# Patient Record
Sex: MALE | Race: WHITE | NOT HISPANIC OR LATINO | URBAN - NONMETROPOLITAN AREA
[De-identification: names, ages, dates, MRNs, and addresses within clinical notes are randomized per-mention and may not be internally consistent; named-entity substitution may affect disease eponyms.]

---

## 2023-04-20 NOTE — PROGRESS NOTES
"Chief Complaint  \"I want a vasectomy \"    Subjective          History of Present Illness  Paulo Drummond presents to [facility] [place] for:    The patient has been pondering the option of a vasectomy for1 year.   He presently has 4 children and is not .   He voices no additional questions about birth control options.   No prior genital procedures.       Current Outpatient Medications:   •  amLODIPine (NORVASC) 5 MG tablet, amlodipine 5 mg tablet  TAKE 1 TABLET BY MOUTH EVERY DAY, Disp: , Rfl:   •  clindamycin (CLEOCIN) 300 MG capsule, Every 8 (Eight) Hours., Disp: , Rfl:   •  lisinopril (PRINIVIL,ZESTRIL) 40 MG tablet, lisinopril 40 mg tablet  TAKE 1 TABLET BY MOUTH EVERY DAY, Disp: , Rfl:   •  omeprazole (priLOSEC) 40 MG capsule, omeprazole 40 mg capsule,delayed release  TAKE 1 CAPSULE BY MOUTH EVERY DAY, Disp: , Rfl:   Past Medical History:   Diagnosis Date   • Hypertension      History reviewed. No pertinent surgical history.      Review  of systems  Constitutional: Negative for chills and fever.   Gastrointestinal: Negative for abdominal pain, anal bleeding and blood in stool.   Genitourinary: Negative for flank pain and hematuria.      Objective   PHYSICAL EXAM  Vital Signs:   Temp 97.2 °F (36.2 °C)   Ht 175.3 cm (69\")   Wt 103 kg (227 lb 12.8 oz)   BMI 33.64 kg/m²     Constitutional: Well nourished, Well developed; No apparent distress  Psychiatric: Appropriate affect; Alert and oriented  Musculoskeletal: Normal gait and station  GI: Abdomen is soft, non-tender  Respiratory: No distress; Unlabored movement; No accessory musculature needed with symmetric movements  ; Penis and testicles are normal.  The vas deferens is very difficult to feel bilaterally due retractile testes bilaterally with resultant decreased scrotal capacity. Vitals reviewed.        DATA  Result Review :         No results found for this or any previous visit.             ASSESSMENT AND PLAN      Problem " List Items Addressed This Visit    None  Visit Diagnoses     Encounter for vasectomy assessment    -  Primary        The patient desires vasectomy. It is emphasized this is a permanent form of birth control. It is explained that reversal, although possible, may not result in pregnancy, is usually not covered by insurance, may not result in producing sperm that are capable of fertilization. Risks of this procedure are discussed.  We discussed that it does take up to 6 months for a patient to clear the proximal sperm through the process of ejaculation.  It is explained that postoperative specimens are essential before consideration of birth control cessation.  Risks of bleeding, infection, sperm granuloma, testicular pain that can become prolonged such as post vasectomy neuralgia, recanalization with resumption of fertility status, testicular atrophy are included in the discussion.  The patient is made aware of other birth control options including permanent sterilization procedures for females.  It is also explained the vasectomy does not reduce the risk of sexually transmitted disease.  It is my recommendation that the patient undergo monitored anesthesia care for the procedure.  The patient has consented to the procedure.        FOLLOW UP     No follow-ups on file.        (Please note that portions of this note were completed with a voice recognition program.)  Christiano Dukes MD  04/27/23  16:20 CDT

## 2023-04-27 ENCOUNTER — OFFICE VISIT (OUTPATIENT)
Dept: UROLOGY | Facility: CLINIC | Age: 43
End: 2023-04-27
Payer: COMMERCIAL

## 2023-04-27 VITALS — HEIGHT: 69 IN | WEIGHT: 227.8 LBS | BODY MASS INDEX: 33.74 KG/M2 | TEMPERATURE: 97.2 F

## 2023-04-27 DIAGNOSIS — Z30.09 ENCOUNTER FOR VASECTOMY ASSESSMENT: Primary | ICD-10-CM

## 2023-04-27 PROCEDURE — 99203 OFFICE O/P NEW LOW 30 MIN: CPT | Performed by: UROLOGY

## 2023-04-27 RX ORDER — CLINDAMYCIN HYDROCHLORIDE 300 MG/1
CAPSULE ORAL EVERY 8 HOURS
COMMUNITY
Start: 2023-04-18

## 2023-04-27 RX ORDER — LISINOPRIL 40 MG/1
TABLET ORAL
COMMUNITY
Start: 2023-04-18

## 2023-04-27 RX ORDER — OMEPRAZOLE 40 MG/1
CAPSULE, DELAYED RELEASE ORAL
COMMUNITY
Start: 2023-04-18

## 2023-04-27 RX ORDER — AMLODIPINE BESYLATE 5 MG/1
TABLET ORAL
COMMUNITY
Start: 2023-04-18

## 2023-05-02 PROBLEM — Z30.09 ENCOUNTER FOR VASECTOMY ASSESSMENT: Status: ACTIVE | Noted: 2023-05-02

## 2023-05-04 ENCOUNTER — TELEPHONE (OUTPATIENT)
Dept: UROLOGY | Facility: CLINIC | Age: 43
End: 2023-05-04
Payer: COMMERCIAL

## 2023-05-04 NOTE — TELEPHONE ENCOUNTER
Called and spoke with patient. I let him know his Vasectomy is scheduled for June 7th. Arrival time will be determined at a later date. Patients pre-op is scheduled for 5/31 at 9:45. Patient understood.    Alert and oriented, no focal deficits, no motor or sensory deficits.

## 2023-05-04 NOTE — TELEPHONE ENCOUNTER
----- Message from Светлана Newman MA sent at 5/2/2023  2:47 PM CDT -----  Patient called asking about his vasectomy- he had mentioned pre op on the 31st and the vas on the 7th, I didn't see anything about this, was unaware if you knew anything

## 2023-05-31 ENCOUNTER — PRE-ADMISSION TESTING (OUTPATIENT)
Dept: PREADMISSION TESTING | Facility: HOSPITAL | Age: 43
End: 2023-05-31
Payer: COMMERCIAL

## 2023-05-31 VITALS
BODY MASS INDEX: 33.31 KG/M2 | HEIGHT: 69 IN | RESPIRATION RATE: 18 BRPM | WEIGHT: 224.87 LBS | HEART RATE: 74 BPM | DIASTOLIC BLOOD PRESSURE: 93 MMHG | SYSTOLIC BLOOD PRESSURE: 158 MMHG | OXYGEN SATURATION: 98 %

## 2023-05-31 LAB
ANION GAP SERPL CALCULATED.3IONS-SCNC: 10 MMOL/L (ref 5–15)
BUN SERPL-MCNC: 13 MG/DL (ref 6–20)
BUN/CREAT SERPL: 14.1 (ref 7–25)
CALCIUM SPEC-SCNC: 8.8 MG/DL (ref 8.6–10.5)
CHLORIDE SERPL-SCNC: 104 MMOL/L (ref 98–107)
CO2 SERPL-SCNC: 25 MMOL/L (ref 22–29)
CREAT SERPL-MCNC: 0.92 MG/DL (ref 0.76–1.27)
DEPRECATED RDW RBC AUTO: 39.9 FL (ref 37–54)
EGFRCR SERPLBLD CKD-EPI 2021: 104.5 ML/MIN/1.73
ERYTHROCYTE [DISTWIDTH] IN BLOOD BY AUTOMATED COUNT: 13 % (ref 12.3–15.4)
GLUCOSE SERPL-MCNC: 81 MG/DL (ref 65–99)
HCT VFR BLD AUTO: 46.9 % (ref 37.5–51)
HGB BLD-MCNC: 15.3 G/DL (ref 13–17.7)
MCH RBC QN AUTO: 27.2 PG (ref 26.6–33)
MCHC RBC AUTO-ENTMCNC: 32.6 G/DL (ref 31.5–35.7)
MCV RBC AUTO: 83.5 FL (ref 79–97)
PLATELET # BLD AUTO: 241 10*3/MM3 (ref 140–450)
PMV BLD AUTO: 10.6 FL (ref 6–12)
POTASSIUM SERPL-SCNC: 4 MMOL/L (ref 3.5–5.2)
RBC # BLD AUTO: 5.62 10*6/MM3 (ref 4.14–5.8)
SODIUM SERPL-SCNC: 139 MMOL/L (ref 136–145)
WBC NRBC COR # BLD: 5.97 10*3/MM3 (ref 3.4–10.8)

## 2023-05-31 PROCEDURE — 36415 COLL VENOUS BLD VENIPUNCTURE: CPT

## 2023-05-31 PROCEDURE — 80048 BASIC METABOLIC PNL TOTAL CA: CPT

## 2023-05-31 PROCEDURE — 85027 COMPLETE CBC AUTOMATED: CPT

## 2023-05-31 NOTE — DISCHARGE INSTRUCTIONS
Before you come to the hospital        Arrival time: AS DIRECTED BY OFFICE   PLEASE HOLD YOUR LISINIPRIL FOR 24 HRS PRIOR TO SURGERY          ALL OTHER HOME MEDICATION CHECK WITH YOUR PHYSICIAN (especially if   you are taking diabetes medicines or blood thinners)    Do not take any Erectile Dysfunction medications (EX: CIALIS, VIAGRA) 24 hours prior to surgery.      If you were given and instructed to use a germ- killing soap, use as directed the night before surgery and again the morning of surgery or as directed by your surgeon. (Use one-half of the bottle with each shower.)   See attached information for How to Use Chlorhexidine for Bathing if applicable.            Eating and drinking restrictions prior to scheduled arrival time    2 Hours before arrival time STOP   Drinking Clear liquids (water, black coffee-NO CREAM,  apple juice-no pulp)      8 Hours before arrival time STOP   All food, full liquids, and dairy products    (It is extremely important that you follow these guidelines to prevent delay or cancelation of your procedure)     Clear Liquids  Water and flavored water                                                                      Clear Fruit juices, such as cranberry juice and apple juice.  Black coffee (NO cream of any kind, including powdered).  Plain tea  Clear bouillon or broth.  Flavored gelatin.  Soda.  Gatorade or Powerade.  Full liquid examples  Juices that have pulp.  Frozen ice pops that contain fruit pieces.  Coffee with creamer  Milk.  Yogurt.                MANAGING PAIN AFTER SURGERY    We know you are probably wondering what your pain will be like after surgery.  Following surgery it is unrealistic to expect you will not have pain.   Pain is how our bodies let us know that something is wrong or cautions us to be careful.  That said, our goal is to make your pain tolerable.    Methods we may use to treat your pain include (oral or IV medications, PCAs, epidurals, nerve blocks, etc.)    While some procedures require IV pain medications for a short time after surgery, transitioning to pain medications by mouth allows for better management of pain.   Your nurse will encourage you to take oral pain medications whenever possible.  IV medications work almost immediately, but only last a short while.  Taking medications by mouth allows for a more constant level of medication in your blood stream for a longer period of time.      Once your pain is out of control it is harder to get back under control.  It is important you are aware when your next dose of pain medication is due.  If you are admitted, your nurse may write the time of your next dose on the white board in your room to help you remember.      We are interested in your pain and encourage you to inform us about aggravating factors during your visit.   Many times a simple repositioning every few hours can make a big difference.    If your physician says it is okay, do not let your pain prevent you from getting out of bed. Be sure to call your nurse for assistance prior to getting up so you do not fall.      Before surgery, please decide your tolerable pain goal.  These faces help describe the pain ratings we use on a 0-10 scale.   Be prepared to tell us your goal and whether or not you take pain or anxiety medications at home.          Preparing for Surgery  Preparing for surgery is an important part of your care. It can make things go more smoothly and help you avoid complications. The steps leading up to surgery may vary among hospitals. Follow all instructions given to you by your health care providers. Ask questions if you do not understand something. Talk about any concerns that you have.  Here are some questions to consider asking before your surgery:  If my surgery is not an emergency (is elective), when would be the best time to have the surgery?  What arrangements do I need to make for work, home, or school?  What will my recovery be  like? How long will it be before I can return to normal activities?  Will I need to prepare my home? Will I need to arrange care for me or my children?  Should I expect to have pain after surgery? What are my pain management options? Are there nonmedical options that I can try for pain?  Tell a health care provider about:  Any allergies you have.  All medicines you are taking, including vitamins, herbs, eye drops, creams, and over-the-counter medicines.  Any problems you or family members have had with anesthetic medicines.  Any blood disorders you have.  Any surgeries you have had.  Any medical conditions you have.  Whether you are pregnant or may be pregnant.  What are the risks?  The risks and complications of surgery depend on the specific procedure that you have. Discuss all the risks with your health care providers before your surgery. Ask about common surgical complications, which may include:  Infection.  Bleeding or a need for blood replacement (transfusion).  Allergic reactions to medicines.  Damage to surrounding nerves, tissues, or structures.  A blood clot.  Scarring.  Failure of the surgery to correct the problem.  Follow these instructions before the procedure:  Several days or weeks before your procedure  You may have a physical exam by your primary health care provider to make sure it is safe for you to have surgery.  You may have testing. This may include a chest X-ray, blood and urine tests, electrocardiogram (ECG), or other testing.  Ask your health care provider about:  Changing or stopping your regular medicines. This is especially important if you are taking diabetes medicines or blood thinners.  Taking medicines such as aspirin and ibuprofen. These medicines can thin your blood. Do not take these medicines unless your health care provider tells you to take them.  Taking over-the-counter medicines, vitamins, herbs, and supplements.  Do not use any products that contain nicotine or tobacco,  such as cigarettes and e-cigarettes. If you need help quitting, ask your health care provider.  Avoid alcohol.  Ask your health care provider if there are exercises you can do to prepare for surgery.  Eat a healthy diet.   Plan to have someone 18 years of age or older to take you home from the hospital. We will need to verify your ride on the morning of surgery if you are being discharged home on the same day. Tell your ride to be expecting a call from the hospital prior to your procedure.   Plan to have a responsible adult care for you for at least 24 hours after you leave the hospital or clinic. This is important.  The day before your procedure  You may be given antibiotic medicine to take by mouth to help prevent infection. Take it as told by your health care provider.  You may be asked to shower with a germ-killing soap.  Follow instructions from your health care provider about eating and drinking restrictions. This includes gum, mints and hard candy.  Pack comfortable clothes according to your procedure.   The day of your procedure  You may need to take another shower with a germ-killing soap before you leave home in the morning.  With a small sip of water, take only the medicines that you are told to take.  Remove all jewelry including rings.   Leave anything you consider valuable at home except hearing aids if needed.  You do not need to bring your home medications into the hospital.   Do not wear any makeup, nail polish, powder, deodorant, lotion, hair accessories, or anything on your skin or body except your clothes.  If you will be staying in the hospital, bring a case to hold your glasses, contacts, or dentures. You may also want to bring your robe and non-skid footwear.       (Do not use denture adhesives since you will be asked to remove them during  surgery).   If you wear oxygen at home, bring it with you the day of surgery.  If instructed by your health care provider, bring your sleep apnea device  with you on the day of your surgery (if this applies to you).  You may want to leave your suitcase and sleep apnea device in the car until after surgery.   Arrive at the hospital as scheduled.  Bring a friend or family member with you who can help to answer questions and be present while you meet with your health care provider.  At the hospital  When you arrive at the hospital:  Go to registration located at the main entrance of the hospital. You will be registered and given a beeper and a sticker sheet. Take the stickers to the Outpatient nurses desk and place in the black tray. This is to notify staff that you have arrived. Then return to the lobby to wait.   When your beeper lights up and vibrates proceed through the double doors, under the stairs, and a member of the Outpatient Surgery staff will escort you to your preoperative room.  You may have to wear compression sleeves. These help to prevent blood clots and reduce swelling in your legs.  An IV may be inserted into one of your veins.              In the operating room, you may be given one or more of the following:        A medicine to help you relax (sedative).        A medicine to numb the area (local anesthetic).        A medicine to make you fall asleep (general anesthetic).        A medicine that is injected into an area of your body to numb everything below the                      injection site (regional anesthetic).  You may be given an antibiotic through your IV to help prevent infection.  Your surgical site will be marked or identified.    Contact a health care provider if you:  Develop a fever of more than 100.4°F (38°C) or other feelings of illness during the 48 hours before your surgery.  Have symptoms that get worse.  Have questions or concerns about your surgery.  Summary  Preparing for surgery can make the procedure go more smoothly and lower your risk of complications.  Before surgery, make a list of questions and concerns to discuss with  your surgeon. Ask about the risks and possible complications.  In the days or weeks before your surgery, follow all instructions from your health care provider. You may need to stop smoking, avoid alcohol, follow eating restrictions, and change or stop your regular medicines.  Contact your surgeon if you develop a fever or other signs of illness during the few days before your surgery.  This information is not intended to replace advice given to you by your health care provider. Make sure you discuss any questions you have with your health care provider.  Document Revised: 12/21/2018 Document Reviewed: 10/23/2018  Elsevier Patient Education © 2021 [employer]

## 2023-06-05 ENCOUNTER — TELEPHONE (OUTPATIENT)
Dept: UROLOGY | Facility: CLINIC | Age: 43
End: 2023-06-05
Payer: COMMERCIAL

## 2023-06-05 NOTE — TELEPHONE ENCOUNTER
Called patient to remind them to arrive at patient registration on 6/7/23 at 530AM for the procedure with Dr. Dukes. Spoke with patient. Told patient if they had any questions to please contact our office at [phone].

## 2023-06-07 ENCOUNTER — ANESTHESIA EVENT (OUTPATIENT)
Dept: PERIOP | Facility: HOSPITAL | Age: 43
End: 2023-06-07
Payer: COMMERCIAL

## 2023-06-07 ENCOUNTER — ANESTHESIA (OUTPATIENT)
Dept: PERIOP | Facility: HOSPITAL | Age: 43
End: 2023-06-07
Payer: COMMERCIAL

## 2023-06-07 ENCOUNTER — HOSPITAL ENCOUNTER (OUTPATIENT)
Facility: HOSPITAL | Age: 43
Setting detail: HOSPITAL OUTPATIENT SURGERY
Discharge: HOME | End: 2023-06-07
Attending: UROLOGY | Admitting: UROLOGY
Payer: COMMERCIAL

## 2023-06-07 VITALS
TEMPERATURE: 97.6 F | RESPIRATION RATE: 16 BRPM | HEART RATE: 67 BPM | OXYGEN SATURATION: 95 % | SYSTOLIC BLOOD PRESSURE: 153 MMHG | DIASTOLIC BLOOD PRESSURE: 100 MMHG

## 2023-06-07 DIAGNOSIS — Z30.09 ENCOUNTER FOR VASECTOMY ASSESSMENT: Primary | ICD-10-CM

## 2023-06-07 PROCEDURE — 25010000002 DEXAMETHASONE PER 1 MG: Performed by: NURSE ANESTHETIST, CERTIFIED REGISTERED

## 2023-06-07 PROCEDURE — 25010000002 DEXAMETHASONE PER 1 MG: Performed by: ANESTHESIOLOGY

## 2023-06-07 PROCEDURE — 25010000002 PROPOFOL 10 MG/ML EMULSION: Performed by: NURSE ANESTHETIST, CERTIFIED REGISTERED

## 2023-06-07 PROCEDURE — S0260 H&P FOR SURGERY: HCPCS | Performed by: UROLOGY

## 2023-06-07 PROCEDURE — 0 LIDOCAINE 1 % SOLUTION: Performed by: UROLOGY

## 2023-06-07 PROCEDURE — 25010000002 FENTANYL CITRATE (PF) 100 MCG/2ML SOLUTION: Performed by: NURSE ANESTHETIST, CERTIFIED REGISTERED

## 2023-06-07 PROCEDURE — 25010000002 ONDANSETRON PER 1 MG: Performed by: NURSE ANESTHETIST, CERTIFIED REGISTERED

## 2023-06-07 PROCEDURE — 55250 REMOVAL OF SPERM DUCT(S): CPT | Performed by: UROLOGY

## 2023-06-07 PROCEDURE — 25010000002 MIDAZOLAM PER 1 MG: Performed by: ANESTHESIOLOGY

## 2023-06-07 DEVICE — CLIP LIGAT VASC HORIZON TI MD BLU 6CT: Type: IMPLANTABLE DEVICE | Site: TESTICLE | Status: FUNCTIONAL

## 2023-06-07 RX ORDER — SODIUM CHLORIDE 9 MG/ML
40 INJECTION, SOLUTION INTRAVENOUS AS NEEDED
Status: DISCONTINUED | OUTPATIENT
Start: 2023-06-07 | End: 2023-06-07 | Stop reason: HOSPADM

## 2023-06-07 RX ORDER — FLUMAZENIL 0.1 MG/ML
0.2 INJECTION INTRAVENOUS AS NEEDED
Status: DISCONTINUED | OUTPATIENT
Start: 2023-06-07 | End: 2023-06-07 | Stop reason: HOSPADM

## 2023-06-07 RX ORDER — DEXAMETHASONE SODIUM PHOSPHATE 4 MG/ML
4 INJECTION, SOLUTION INTRA-ARTICULAR; INTRALESIONAL; INTRAMUSCULAR; INTRAVENOUS; SOFT TISSUE ONCE AS NEEDED
Status: COMPLETED | OUTPATIENT
Start: 2023-06-07 | End: 2023-06-07

## 2023-06-07 RX ORDER — HYDROCODONE BITARTRATE AND ACETAMINOPHEN 5; 325 MG/1; MG/1
1 TABLET ORAL EVERY 8 HOURS PRN
Qty: 9 TABLET | Refills: 0 | Status: SHIPPED | OUTPATIENT
Start: 2023-06-07 | End: 2023-06-10

## 2023-06-07 RX ORDER — SODIUM CHLORIDE 0.9 % (FLUSH) 0.9 %
3-10 SYRINGE (ML) INJECTION AS NEEDED
Status: DISCONTINUED | OUTPATIENT
Start: 2023-06-07 | End: 2023-06-07 | Stop reason: HOSPADM

## 2023-06-07 RX ORDER — DROPERIDOL 2.5 MG/ML
0.62 INJECTION, SOLUTION INTRAMUSCULAR; INTRAVENOUS ONCE AS NEEDED
Status: DISCONTINUED | OUTPATIENT
Start: 2023-06-07 | End: 2023-06-07 | Stop reason: HOSPADM

## 2023-06-07 RX ORDER — SODIUM CHLORIDE, SODIUM LACTATE, POTASSIUM CHLORIDE, CALCIUM CHLORIDE 600; 310; 30; 20 MG/100ML; MG/100ML; MG/100ML; MG/100ML
100 INJECTION, SOLUTION INTRAVENOUS CONTINUOUS
Status: DISCONTINUED | OUTPATIENT
Start: 2023-06-07 | End: 2023-06-07 | Stop reason: HOSPADM

## 2023-06-07 RX ORDER — OXYCODONE AND ACETAMINOPHEN 10; 325 MG/1; MG/1
1 TABLET ORAL EVERY 4 HOURS PRN
Status: DISCONTINUED | OUTPATIENT
Start: 2023-06-07 | End: 2023-06-07 | Stop reason: HOSPADM

## 2023-06-07 RX ORDER — MIDAZOLAM HYDROCHLORIDE 1 MG/ML
2 INJECTION INTRAMUSCULAR; INTRAVENOUS
Status: DISCONTINUED | OUTPATIENT
Start: 2023-06-07 | End: 2023-06-07 | Stop reason: HOSPADM

## 2023-06-07 RX ORDER — NALOXONE HCL 0.4 MG/ML
0.4 VIAL (ML) INJECTION AS NEEDED
Status: DISCONTINUED | OUTPATIENT
Start: 2023-06-07 | End: 2023-06-07 | Stop reason: HOSPADM

## 2023-06-07 RX ORDER — ACETAMINOPHEN 500 MG
1000 TABLET ORAL ONCE
Status: COMPLETED | OUTPATIENT
Start: 2023-06-07 | End: 2023-06-07

## 2023-06-07 RX ORDER — PROPOFOL 10 MG/ML
VIAL (ML) INTRAVENOUS AS NEEDED
Status: DISCONTINUED | OUTPATIENT
Start: 2023-06-07 | End: 2023-06-07 | Stop reason: SURG

## 2023-06-07 RX ORDER — SODIUM CHLORIDE 9 MG/ML
INJECTION, SOLUTION INTRAVENOUS AS NEEDED
Status: DISCONTINUED | OUTPATIENT
Start: 2023-06-07 | End: 2023-06-07 | Stop reason: HOSPADM

## 2023-06-07 RX ORDER — HYDROCODONE BITARTRATE AND ACETAMINOPHEN 5; 325 MG/1; MG/1
1 TABLET ORAL ONCE AS NEEDED
Status: DISCONTINUED | OUTPATIENT
Start: 2023-06-07 | End: 2023-06-07 | Stop reason: HOSPADM

## 2023-06-07 RX ORDER — IBUPROFEN 200 MG
CAPSULE ORAL AS NEEDED
Status: DISCONTINUED | OUTPATIENT
Start: 2023-06-07 | End: 2023-06-07 | Stop reason: HOSPADM

## 2023-06-07 RX ORDER — OXYCODONE HYDROCHLORIDE AND ACETAMINOPHEN 5; 325 MG/1; MG/1
1 TABLET ORAL ONCE AS NEEDED
Status: DISCONTINUED | OUTPATIENT
Start: 2023-06-07 | End: 2023-06-07 | Stop reason: HOSPADM

## 2023-06-07 RX ORDER — SODIUM CHLORIDE 0.9 % (FLUSH) 0.9 %
3 SYRINGE (ML) INJECTION EVERY 12 HOURS SCHEDULED
Status: DISCONTINUED | OUTPATIENT
Start: 2023-06-07 | End: 2023-06-07 | Stop reason: HOSPADM

## 2023-06-07 RX ORDER — LABETALOL HYDROCHLORIDE 5 MG/ML
5 INJECTION, SOLUTION INTRAVENOUS
Status: DISCONTINUED | OUTPATIENT
Start: 2023-06-07 | End: 2023-06-07 | Stop reason: HOSPADM

## 2023-06-07 RX ORDER — ONDANSETRON 2 MG/ML
INJECTION INTRAMUSCULAR; INTRAVENOUS AS NEEDED
Status: DISCONTINUED | OUTPATIENT
Start: 2023-06-07 | End: 2023-06-07 | Stop reason: SURG

## 2023-06-07 RX ORDER — LIDOCAINE HYDROCHLORIDE 10 MG/ML
0.5 INJECTION, SOLUTION EPIDURAL; INFILTRATION; INTRACAUDAL; PERINEURAL ONCE AS NEEDED
Status: DISCONTINUED | OUTPATIENT
Start: 2023-06-07 | End: 2023-06-07 | Stop reason: HOSPADM

## 2023-06-07 RX ORDER — ONDANSETRON 2 MG/ML
4 INJECTION INTRAMUSCULAR; INTRAVENOUS ONCE AS NEEDED
Status: DISCONTINUED | OUTPATIENT
Start: 2023-06-07 | End: 2023-06-07 | Stop reason: HOSPADM

## 2023-06-07 RX ORDER — FENTANYL CITRATE 50 UG/ML
INJECTION, SOLUTION INTRAMUSCULAR; INTRAVENOUS AS NEEDED
Status: DISCONTINUED | OUTPATIENT
Start: 2023-06-07 | End: 2023-06-07 | Stop reason: SURG

## 2023-06-07 RX ORDER — DEXAMETHASONE SODIUM PHOSPHATE 4 MG/ML
INJECTION, SOLUTION INTRA-ARTICULAR; INTRALESIONAL; INTRAMUSCULAR; INTRAVENOUS; SOFT TISSUE AS NEEDED
Status: DISCONTINUED | OUTPATIENT
Start: 2023-06-07 | End: 2023-06-07 | Stop reason: SURG

## 2023-06-07 RX ORDER — FENTANYL CITRATE 50 UG/ML
25 INJECTION, SOLUTION INTRAMUSCULAR; INTRAVENOUS
Status: DISCONTINUED | OUTPATIENT
Start: 2023-06-07 | End: 2023-06-07 | Stop reason: HOSPADM

## 2023-06-07 RX ORDER — IBUPROFEN 600 MG/1
600 TABLET ORAL ONCE AS NEEDED
Status: DISCONTINUED | OUTPATIENT
Start: 2023-06-07 | End: 2023-06-07 | Stop reason: HOSPADM

## 2023-06-07 RX ORDER — LIDOCAINE HYDROCHLORIDE 10 MG/ML
INJECTION, SOLUTION INFILTRATION; PERINEURAL AS NEEDED
Status: DISCONTINUED | OUTPATIENT
Start: 2023-06-07 | End: 2023-06-07 | Stop reason: HOSPADM

## 2023-06-07 RX ORDER — SODIUM CHLORIDE 0.9 % (FLUSH) 0.9 %
3 SYRINGE (ML) INJECTION AS NEEDED
Status: DISCONTINUED | OUTPATIENT
Start: 2023-06-07 | End: 2023-06-07 | Stop reason: HOSPADM

## 2023-06-07 RX ORDER — SODIUM CHLORIDE, SODIUM LACTATE, POTASSIUM CHLORIDE, CALCIUM CHLORIDE 600; 310; 30; 20 MG/100ML; MG/100ML; MG/100ML; MG/100ML
1000 INJECTION, SOLUTION INTRAVENOUS CONTINUOUS
Status: DISCONTINUED | OUTPATIENT
Start: 2023-06-07 | End: 2023-06-07 | Stop reason: HOSPADM

## 2023-06-07 RX ADMIN — SODIUM CHLORIDE, POTASSIUM CHLORIDE, SODIUM LACTATE AND CALCIUM CHLORIDE 1000 ML: 600; 310; 30; 20 INJECTION, SOLUTION INTRAVENOUS at 06:20

## 2023-06-07 RX ADMIN — DEXAMETHASONE SODIUM PHOSPHATE 4 MG: 4 INJECTION, SOLUTION INTRAMUSCULAR; INTRAVENOUS at 08:26

## 2023-06-07 RX ADMIN — MIDAZOLAM 2 MG: 1 INJECTION INTRAMUSCULAR; INTRAVENOUS at 07:29

## 2023-06-07 RX ADMIN — FENTANYL CITRATE 50 MCG: 50 INJECTION, SOLUTION INTRAMUSCULAR; INTRAVENOUS at 08:01

## 2023-06-07 RX ADMIN — ACETAMINOPHEN 1000 MG: 500 TABLET, FILM COATED ORAL at 07:29

## 2023-06-07 RX ADMIN — ONDANSETRON 4 MG: 2 INJECTION INTRAMUSCULAR; INTRAVENOUS at 08:26

## 2023-06-07 RX ADMIN — DEXAMETHASONE SODIUM PHOSPHATE 4 MG: 4 INJECTION INTRA-ARTICULAR; INTRALESIONAL; INTRAMUSCULAR; INTRAVENOUS; SOFT TISSUE at 07:29

## 2023-06-07 RX ADMIN — FENTANYL CITRATE 50 MCG: 50 INJECTION, SOLUTION INTRAMUSCULAR; INTRAVENOUS at 08:22

## 2023-06-07 RX ADMIN — PROPOFOL INJECTABLE EMULSION 200 MG: 10 INJECTION, EMULSION INTRAVENOUS at 08:01

## 2023-06-07 NOTE — DISCHARGE INSTRUCTIONS
Post Op Vasectomy Instructions    Can I drive myself home?   You must have someone available to drive you home after your procedure if you had IV anesthesia or oral sedation. Even if your procedure is performed under local anesthesia, bringing a  is preferred.      What should I expect after my procedure?   After a vasectomy it is normal to experience:  · Mild pain · Mild swelling of the scrotum · A small amount of fluid drainage from the puncture site(s) that can continue for several days (you can apply a gauze pad) · A marble-sized knot above the testicle, one side may heal faster than the other.  If there is a stitch in the skin it will dissolve on its own.       When should I call for help?   Call the clinic if you have: · Unusual or severe pain that is not relieved by pain medication · Lots of bleeding or drainage · Lots of swelling or redness · Foul odor · Fever over 101.5° Fahrenheit      What is the contact information? Please contact the Urology clinic at [phone]. . After 5:00 pm the Answering Service will answer this number and direct it to the appropriate provider.       Urology After Surgery Instructions for Vasectomy   What are my postoperative instructions?   · Avoid strenuous activity for seven (7) days after your procedure. This includes any heavy lifting >25 #'s. You can gradually increase your activities afterwards, guided by your pain level. Avoid activities that cause pain at your scrotal sites.   · You may return to work in two days, as long as no strenuous activity is required. · You may shower tomorrow, but do not take a bath or use a hot tub for 5 days. · Wear your athletic underwear, compression shorts, jock strap/athletic supporter for the next 72 hours. Remove it to shower after 24 hours, but then replace it afterwards.    · You may continue your normal diet.  · You may resume having sex after seven (7) days, if comfortable enough.       · YOU MUST CONTINUE USING OTHER FORMS OF  BIRTH CONTROL UNTIL CLEARED BY YOUR DOCTOR!     How can I manage my pain?    You may use the Norco prescription for 2 days as needed.   Tylenol and ibuprofen work in different ways to treat pain. You can use them together to maximize their effects. · Take your medications by following this dosing schedule:   o Take Tylenol 1000mg every 8 hours (maximum 3000mg daily)   o Take Ibuprofen 400mg every 8 hours (take with food) · You need to wait 24 hours from the vasectomy to begin this due to increased risk of bleeding.     Ice your scrotum on and off on this schedule:     o Apply ice or a cold pack to the scrotum for 20 minutes, then remove for 20 minutes, then repeat all day on the day of your procedure.   You can use a bag of frozen peas to mold to the area but these do tend to warm a little too soon.   Continue this until you go to sleep.   Ice again the following day if you need it.        · Elevate the scrotum the first day and night. This is good for healing and will also help with swelling.      What is my follow-up care?   Post-Vasectomy Semen Analysis (Sperm Checks)   A VASECTOMY IS NOT IMMEDIATELY EFFECTIVE.  UNTIL YOU ARE TOLD THAT YOU ARE STERILE, IT IS ESSENTIAL THAT YOU KEEP USING ANOTHER FORM OF BIRTH CONTROL.     Following vasectomy you should bring a specimen to the lab to confirm that there is no evidence of sperm before stopping birth control.  I would recommend that you bring this in about three months after the vasectomy is done. You need to get the specimen to the lab within an hour of obtaining it if at all possible.       SEXUAL ACTIVITY WITHOUT BIRTH CONTROL CAN LEAD TO PREGNANCY FOLLOWING VASECTOMY UNTIL CONFIRMATION HAS BEEN MADE MY SEMEN ANALYSIS.  THEREFORE YOU OR YOUR SPOUSE SHOULD USE BIRTH CONTROL UNTIL ABSENCE OF SPERM IS CONFIRMED BY THE LAB.     The lab will do this test for around $40 which is a significantly reduce price for our vasectomy patients paying cash. It is rarely covered by  insurance. If you ask the lab to file this with your insurance and the insurance denies it (they almost always do), the cost for the test is about $120.     It is also recommended that you call the office the day before you are going to bring in the sample so we can be sure to have an order over at the lab expedite your visit.

## 2023-06-07 NOTE — ANESTHESIA PREPROCEDURE EVALUATION
Anesthesia Evaluation     Patient summary reviewed   no history of anesthetic complications:   NPO Solid Status: > 8 hours  NPO Liquid Status: > 8 hours           Airway   Mallampati: I  TM distance: >3 FB  No difficulty expected  Dental - normal exam     Pulmonary - negative pulmonary ROS   Cardiovascular   Exercise tolerance: excellent (>7 METS)    (+) hypertension      Neuro/Psych- negative ROS  GI/Hepatic/Renal/Endo    (+) obesity, GERD    Musculoskeletal     Abdominal    Substance History      OB/GYN          Other                      Anesthesia Plan    ASA 2     general     intravenous induction     Anesthetic plan, risks, benefits, and alternatives have been provided, discussed and informed consent has been obtained with: patient.    CODE STATUS:

## 2023-06-07 NOTE — H&P
Urology H&P    Mr. Drummond is 43 y.o. male    CHIEF COMPLAINT: Patient desires permanent sterilization    HPI  Patient is here today for vasectomy.  A previous visit in the office we went over the risks alternatives and benefits of this.    The following portions of the patient's history were reviewed and updated as appropriate: allergies, current medications, past family history, past medical history, past social history, past surgical history and problem list.    Review of Systems  Review  of systems  Constitutional: Negative for chills and fever.   Gastrointestinal: Negative for abdominal pain, anal bleeding and blood in stool.   Genitourinary: Negative for flank pain and hematuria.      Medications Prior to Admission   Medication Sig Dispense Refill Last Dose    amLODIPine (NORVASC) 5 MG tablet Take 1 tablet by mouth Daily.   6/5/2023    lisinopril (PRINIVIL,ZESTRIL) 40 MG tablet Take 1 tablet by mouth Daily.   6/5/2023    omeprazole (priLOSEC) 40 MG capsule Take 1 capsule by mouth Daily.   6/5/2023         Current Facility-Administered Medications:     lactated ringers infusion 1,000 mL, 1,000 mL, Intravenous, Continuous, Christiano Dukes MD, Last Rate: 25 mL/hr at 06/07/23 0620, 1,000 mL at 06/07/23 0620    lidocaine PF 1% (XYLOCAINE) injection 0.5 mL, 0.5 mL, Intradermal, Once PRN, Christiano Dukes MD    sodium chloride 0.9 % flush 3 mL, 3 mL, Intravenous, PRN, Christiano Dukes MD    Past Medical History:   Diagnosis Date    GERD (gastroesophageal reflux disease)     Hypertension        Past Surgical History:   Procedure Laterality Date    ENDOSCOPY         Social History     Socioeconomic History    Marital status:    Tobacco Use    Smoking status: Never    Smokeless tobacco: Current     Types: Snuff   Vaping Use    Vaping Use: Never used   Substance and Sexual Activity    Alcohol use: Yes     Comment: occ    Drug use: Never    Sexual activity: Yes     Partners: Female       History reviewed. No  pertinent family history.    BP (!) 159/110 (BP Location: Left arm, Patient Position: Sitting)   Pulse 67   Temp 97.6 °F (36.4 °C) (Temporal)   Resp 16   SpO2 98%     Physical Exam  Constitutional:   Temp:  [97.6 °F (36.4 °C)] 97.6 °F (36.4 °C)  Heart Rate:  [67] 67  Resp:  [16] 16  BP: (159)/(110) 159/110  ] Well developed, well nourished, no distress  Respiratory:   Effort unlabored; Movements symmetric  GI:   No mass or hernia noted, not distended or tender   No enlargement of spleen or liver noted  Skin:   No pallor or cyanosis; No obvious rash  Psych:   Alert, Oriented x 3         Lab Results   Component Value Date    GLUCOSE 81 05/31/2023    BUN 13 05/31/2023    CREATININE 0.92 05/31/2023    BCR 14.1 05/31/2023    CO2 25.0 05/31/2023    CALCIUM 8.8 05/31/2023     Lab Results   Component Value Date    GLUCOSE 81 05/31/2023    CALCIUM 8.8 05/31/2023     05/31/2023    K 4.0 05/31/2023    CO2 25.0 05/31/2023     05/31/2023    BUN 13 05/31/2023    CREATININE 0.92 05/31/2023    BCR 14.1 05/31/2023    ANIONGAP 10.0 05/31/2023     Lab Results   Component Value Date    WBC 5.97 05/31/2023    HGB 15.3 05/31/2023    HCT 46.9 05/31/2023    MCV 83.5 05/31/2023     05/31/2023     No results found for: PSA  No results found for: URINECX  Brief Urine Lab Results       None              Imaging Results (Last 7 Days)       ** No results found for the last 168 hours. **            Assessment and Plan  Patient desires permanent sterilization  -Patient is to undergo vasectomy today in the hospital setting.  -Discussed with patient at previous visit and again today the following:     Vasectomy should be considered a permanent form of sterilization  Vasectomy is not 100% effective  Vasectomy is not immediately effective.  Patient should continue using birth control following procedure until he is brought in a semen analysis in about 3 months that shows no live sperm.  Vasectomy causes chronic scrotal pain in  1% of patients  Patient had a good recollection of the discussion we had in the office setting.      (Please note that portions of this note were completed with a voice recognition program.)  Christiano Dukes MD  06/07/23  06:48 CDT

## 2023-06-07 NOTE — ANESTHESIA PROCEDURE NOTES
Airway  Date/Time: 6/7/2023 8:02 AM    General Information and Staff    CRNA/CAA: Ambrosio Fisher CRNA    Indications and Patient Condition  Indications for airway management: CNS depression    Preoxygenated: yes  Mask difficulty assessment: 0 - not attempted    Final Airway Details  Final airway type: supraglottic airway      Successful airway: I-gel  Size 5     Number of attempts at approach: 1  Assessment: lips, teeth, and gum same as pre-op and atraumatic intubation    Additional Comments  Atraumatic Insertion

## 2023-06-07 NOTE — ANESTHESIA POSTPROCEDURE EVALUATION
Patient: Paulo Drummond    Procedure Summary       Date: 06/07/23 Room / Location:  [facility]  /  [facility]    Anesthesia Start: 0758 Anesthesia Stop: 0837    Procedure: VASECTOMY (Bilateral: Scrotum) Diagnosis:       Encounter for vasectomy assessment      (Encounter for vasectomy assessment [Z30.09])    Surgeons: Christiano Dukes MD Provider: Ambrosio Fisher CRNA    Anesthesia Type: general ASA Status: 2            Anesthesia Type: general    Vitals  Vitals Value Taken Time   /90 06/07/23 0906   Temp 97.6 °F (36.4 °C) 06/07/23 0900   Pulse 73 06/07/23 0907   Resp 14 06/07/23 0900   SpO2 92 % 06/07/23 0907   Vitals shown include unvalidated device data.        Post Anesthesia Care and Evaluation    Patient location during evaluation: bedside  Patient participation: complete - patient participated  Level of consciousness: awake and awake and alert  Pain score: 0  Pain management: adequate    Airway patency: patent  Anesthetic complications: No anesthetic complications  PONV Status: none  Cardiovascular status: acceptable  Respiratory status: acceptable  Hydration status: acceptable    Comments: Patient discharged according to acceptable Emmanuel score per RN assessment. See nursing records for further information.     Blood pressure 153/100, pulse 67, temperature 97.6 °F (36.4 °C), temperature source Temporal, resp. rate 16, SpO2 95 %.

## 2023-06-07 NOTE — OP NOTE
Operative Summary    Paulo Drummond  Date of Procedure: 6/7/2023    Pre-op Diagnosis:   Encounter for vasectomy assessment [Z30.09]    Post-op Diagnosis:     Post-Op Diagnosis Codes:     * Encounter for vasectomy assessment [Z30.09]    Procedure/CPT® Codes:      Procedure(s):  VASECTOMY    Surgeon(s):  Christiano Dukes MD    Anesthesia: Choice    Staff:   Circulator: Leatha Arreola RN  Scrub Person: Ty Sampson; Kallie Biggs    Indications for procedure:  Patient desires permanent sterilization    Findings:   Normal vas deferens bilaterally.    Procedure details:  After appropriate anesthesia, positioning, prep and drape, timeout protocol was observed.      The vas is palpated on the right side and  from the remainder of the cord bluntly and pulled just underneath the skin.  1% lidocaine is infiltrated in the subcutaneous tissue.  An incision is made directly onto the vas.  The perivasal tissue was bluntly stripped away from the vas deferens freeing an approximate 2 cm segment.  Titanium clips were placed both proximally and distally and the intervening segment excised.  The specimen,is discarded.  The ends are fulgurated with electrocautery as well as any vessels.    The left-sided vasectomy was carried out the same as the right with no change in findings or technique.  Both wounds are irrigated with sterile saline.  Any subcutaneous vessels that are bleeding are fulgurated.  The skin is closed with a running 3-0 chromic suture.      Estimated Blood Loss: Less than 30 mL    Specimens:                None      Drains: None    Complications: none    Plan: Patient has been instructed to continue birth control until a negative semen analysis has been noted.  I recommended that the patient bring a specimen to the lab in 3 months.      (Please note that portions of this note were completed with a voice recognition program.)  Christiano Dukes MD     Date: 6/7/2023  Time: 08:46 CDT

## (undated) DEVICE — ELECTRD NDL EZ CLN MOD 2.75IN

## (undated) DEVICE — BAPTIST TURNOVER KIT: Brand: MEDLINE INDUSTRIES, INC.

## (undated) DEVICE — GLV SURG BIOGEL M LTX PF 8

## (undated) DEVICE — GOWN,NON-REINFORCED,SIRUS,SET IN SLV,XXL: Brand: MEDLINE

## (undated) DEVICE — SUT GUT CHRM 3/0 SH 27IN G122H

## (undated) DEVICE — SYR CONTRL PRESS/LO FIX/M/LL W/THMB/RNG 10ML

## (undated) DEVICE — PAD MINOR UNIVERSAL: Brand: MEDLINE INDUSTRIES, INC.

## (undated) DEVICE — NDL HYPO PRECISIONGLIDE/REG 25G 5/8 BLU

## (undated) DEVICE — VAGINAL PREP TRAY: Brand: MEDLINE INDUSTRIES, INC.